# Patient Record
Sex: MALE | Race: BLACK OR AFRICAN AMERICAN | NOT HISPANIC OR LATINO | Employment: OTHER | URBAN - METROPOLITAN AREA
[De-identification: names, ages, dates, MRNs, and addresses within clinical notes are randomized per-mention and may not be internally consistent; named-entity substitution may affect disease eponyms.]

---

## 2018-06-08 ENCOUNTER — HOSPITAL ENCOUNTER (EMERGENCY)
Facility: HOSPITAL | Age: 53
Discharge: HOME | End: 2018-06-08
Attending: EMERGENCY MEDICINE
Payer: COMMERCIAL

## 2018-06-08 VITALS
WEIGHT: 175 LBS | HEART RATE: 72 BPM | SYSTOLIC BLOOD PRESSURE: 119 MMHG | DIASTOLIC BLOOD PRESSURE: 81 MMHG | TEMPERATURE: 99.2 F | OXYGEN SATURATION: 99 % | BODY MASS INDEX: 25.05 KG/M2 | RESPIRATION RATE: 16 BRPM | HEIGHT: 70 IN

## 2018-06-08 DIAGNOSIS — Z20.2 EXPOSURE TO TRICHOMONAS: Primary | ICD-10-CM

## 2018-06-08 DIAGNOSIS — D17.1 LIPOMA OF BACK: ICD-10-CM

## 2018-06-08 LAB
BILIRUB UR QL STRIP: NEGATIVE
CLARITY UR: CLEAR
COLOR UR: YELLOW
GLUCOSE UR STRIP-MCNC: NEGATIVE MG/DL
HGB UR QL STRIP.AUTO: NEGATIVE
KETONES UR STRIP-MCNC: NEGATIVE MG/DL
LEUKOCYTE ESTERASE UR QL STRIP: NEGATIVE
NITRITE UR QL STRIP: NEGATIVE
PH UR STRIP.AUTO: 6.5 [PH] (ref 4.5–8)
PROT UR STRIP-MCNC: NEGATIVE MG/DL
SP GR UR STRIP.AUTO: 1.02 (ref 1–1.03)
UROBILINOGEN UR QL STRIP.AUTO: 0.2 E.U./DL

## 2018-06-08 PROCEDURE — 87491 CHLMYD TRACH DNA AMP PROBE: CPT | Performed by: EMERGENCY MEDICINE

## 2018-06-08 PROCEDURE — 87591 N.GONORRHOEAE DNA AMP PROB: CPT | Performed by: EMERGENCY MEDICINE

## 2018-06-08 PROCEDURE — 99283 EMERGENCY DEPT VISIT LOW MDM: CPT

## 2018-06-08 PROCEDURE — 81003 URINALYSIS AUTO W/O SCOPE: CPT | Performed by: EMERGENCY MEDICINE

## 2018-06-08 PROCEDURE — 87086 URINE CULTURE/COLONY COUNT: CPT | Performed by: EMERGENCY MEDICINE

## 2018-06-08 RX ORDER — METRONIDAZOLE 500 MG/1
500 TABLET ORAL ONCE
Status: COMPLETED | OUTPATIENT
Start: 2018-06-08 | End: 2018-06-08

## 2018-06-08 RX ORDER — METRONIDAZOLE 500 MG/1
500 TABLET ORAL EVERY 12 HOURS SCHEDULED
Qty: 14 TABLET | Refills: 0 | Status: SHIPPED | OUTPATIENT
Start: 2018-06-08 | End: 2018-06-15

## 2018-06-08 RX ADMIN — METRONIDAZOLE 500 MG: 500 TABLET ORAL at 12:06

## 2018-06-08 NOTE — DISCHARGE INSTRUCTIONS
Trichomoniasis   WHAT YOU NEED TO KNOW:   Trichomoniasis is a common sexually transmitted infection (STI)  It is spread between people during sex or genital contact  Trichomoniasis is caused by tiny parasites that are too small to be seen  DISCHARGE INSTRUCTIONS:   Medicines:   · Antibiotics:  Always take your antibiotics exactly as ordered by your healthcare provider  Keep taking this medicine as ordered until it is completely gone, even if you feel better  If you stop taking antibiotics before they are gone, they may not completely cure your infection  Never save antibiotics or take leftover antibiotics that were given to you for another illness  Do not drink alcohol while you use antibiotic medicine to treat trichomoniasis  It may make you sick  Wait at least 3 days after your last dose of medicine before you drink alcohol again  Also avoid over-the-counter medicines that contain alcohol, such as certain cough or cold medicines  · Over-the-counter pain medicine: You may use over-the-counter (OTC) pain medicines, such as ibuprofen or acetaminophen, for pain or swelling  These medicines may be bought without a caregiver's order  These medicines are safe for most people to use  However, they can cause serious problems when they are not used correctly  People with certain medical conditions, or using certain other medicines are at a higher risk for problems  Using too much, or using these medicines for longer than the label says can also cause problems  Follow directions on the label carefully  If you have questions, talk to your caregiver  · Take your medicine as directed  Contact your healthcare provider if you think your medicine is not helping or if you have side effects  Tell him or her if you are allergic to any medicine  Keep a list of the medicines, vitamins, and herbs you take  Include the amounts, and when and why you take them  Bring the list or the pill bottles to follow-up visits  Carry your medicine list with you in case of an emergency  Self care:   · Sex:  Tell your sexual partners that you have this infection  They may also be infected and need treatment  Do not have sex until both you and your partner are done with treatment and all symptoms are gone  · Bathing and handwashing:  Wash your hands thoroughly with soap and warm water after going to the bathroom  This helps keep your infection from spreading to other parts of your body, such as your eyes  Keep your genital area clean and dry  Take showers instead of baths and use plain, unscented soap  · Advice for women:  Do not douche during treatment unless your healthcare provider tells you to  Do not use feminine hygiene sprays or powders  Prevent trichomoniasis:  You can get trichomoniasis more than once  Limit the number of sexual partners you have to decrease your risk for another infection  Do not have unprotected sex (including oral sex)  Always wear a latex condom during sex to prevent trichomoniasis and other STIs  Use a new condom after each ejaculation  For more information:   · Division of STD Prevention, Centers for Disease Control and Prevention  [place] [address] [facility] , [address]  Phone: 1- 268 - [phone]  Web Address: [employer] au  · [address] [address] Rios (MELISSA)  P O  [address] [facility] , [address] [place] [address]  Web Address: http://[employer]/  org  Contact your healthcare provider if:   · Your symptoms become worse, or come back after treatment  · You have unusual vaginal bleeding  · You have any problems that may be caused by the medicine you are taking  · You have questions or concerns about your condition or care  © 2017 [facility] Information is for End User's use only and may not be sold, redistributed or otherwise used for commercial purposes   All illustrations and images included in CareNotes® are the copyrighted property of [employer]  or [employer]  The above information is an  only  It is not intended as medical advice for individual conditions or treatments  Talk to your doctor, nurse or pharmacist before following any medical regimen to see if it is safe and effective for you  Lipoma   WHAT YOU SHOULD KNOW:   A lipoma is a lump made up of fat cells  It is most often found just under your skin on your shoulders, back, or neck, but can be found in other areas of your body  Multiple lipomas found in different areas of your body is called lipomatosis  Lipomas normally grow very slowly and rarely turn into cancer  AFTER YOU LEAVE:   Follow up with your primary healthcare provider as directed: If you had your lipoma removed, you may need to return to have your wound checked or stitches removed  Write down your questions so you remember to ask them during your visits  Contact your primary healthcare provider if:   · You have blood in your bowel movement  · Your lipoma increases in size  · Your lipoma is painful or you have pain in the area of your lipoma  · You have questions or concerns about your condition or care  Seek care immediately or call 911 if:   · You feel a lump in your throat or have trouble swallowing  · You suddenly have trouble breathing  © 2014 [address] is for End User's use only and may not be sold, redistributed or otherwise used for commercial purposes  All illustrations and images included in CareNotes® are the copyrighted property of [employer]  or [employer]  The above information is an  only  It is not intended as medical advice for individual conditions or treatments  Talk to your doctor, nurse or pharmacist before following any medical regimen to see if it is safe and effective for you

## 2018-06-08 NOTE — ED PROVIDER NOTES
History  Chief Complaint   Patient presents with    Exposure to STD     states that his partner tested positive for trichomonis, wants tx  denies symptoms     Patient is a 57-year-old male with no significant past medical surgical history, who presents to the emergency department requesting treatment for trichomoniasis as his sexual partner who is dating recently tested positive for this  Patient states his sexual partner was recently having some vaginal irritation and vaginal discharge  She saw her OBGYN and found out she was positive for trichomoniasis  Patient was told to come to the ED for treatment  He denies any symptoms currently  No fever, chills, abdominal or flank pain, nausea, vomiting, change in bowel habits, penile discharge, dysuria, change in urinary frequency, hematuria, testicular pain or swelling  Rest of review of systems is otherwise negative  He does report for many years he has had a lump on his right scapular region and it is getting progressively larger  He denies that the lump is painful or itchy  Denies any skin discoloration over the lump  He is inquiring as to what it could be  History provided by:  Patient   used: No    Exposure to STD   Associated symptoms: no abdominal pain, no chest pain, no congestion, no cough, no diarrhea, no ear pain, no fever, no headaches, no nausea, no rash, no rhinorrhea, no shortness of breath, no sore throat, no vomiting and no wheezing        None       History reviewed  No pertinent past medical history  History reviewed  No pertinent surgical history  History reviewed  No pertinent family history  I have reviewed and agree with the history as documented  Social History   Substance Use Topics    Smoking status: Never Smoker    Smokeless tobacco: Never Used    Alcohol use Yes      Comment: occ        Review of Systems   Constitutional: Negative for chills and fever     HENT: Negative for congestion, ear pain, rhinorrhea and sore throat  Eyes: Negative for photophobia, pain and visual disturbance  Respiratory: Negative for cough, shortness of breath and wheezing  Cardiovascular: Negative for chest pain and palpitations  Gastrointestinal: Negative for abdominal pain, constipation, diarrhea, nausea and vomiting  Genitourinary: Negative for difficulty urinating, dysuria, flank pain, frequency, genital sores, hematuria, penile pain, penile swelling, scrotal swelling and testicular pain  Musculoskeletal: Negative for back pain, joint swelling, neck pain and neck stiffness  Skin: Negative for color change, rash and wound         + Lump on back   Allergic/Immunologic: Negative for immunocompromised state  Neurological: Negative for dizziness, weakness, light-headedness, numbness and headaches  Hematological: Negative for adenopathy  Does not bruise/bleed easily  Psychiatric/Behavioral: Negative for confusion and decreased concentration  All other systems reviewed and are negative  Physical Exam  Physical Exam   Constitutional: He is oriented to person, place, and time  He appears well-developed and well-nourished  No distress  HENT:   Head: Normocephalic and atraumatic  Mouth/Throat: Oropharynx is clear and moist    Eyes: Conjunctivae and EOM are normal  Pupils are equal, round, and reactive to light  Neck: Normal range of motion  Neck supple  No JVD present  Cardiovascular: Normal rate, regular rhythm, normal heart sounds and intact distal pulses  Exam reveals no gallop and no friction rub  No murmur heard  Pulmonary/Chest: Effort normal and breath sounds normal  No respiratory distress  He has no wheezes  He has no rales  Abdominal: Soft  He exhibits no distension  There is no tenderness  There is no rebound and no guarding  Musculoskeletal: Normal range of motion  He exhibits no edema or tenderness  Neurological: He is alert and oriented to person, place, and time     No gross motor or sensory deficits  Skin: Skin is warm and dry  No rash noted  He is not diaphoretic  No pallor  Nontender lipoma over the right scapula measuring approximately 5 cm in diameter  Psychiatric: He has a normal mood and affect  His behavior is normal    Nursing note and vitals reviewed  Vital Signs  ED Triage Vitals [06/08/18 1127]   Temperature Pulse Respirations Blood Pressure SpO2   99 2 °F (37 3 °C) 72 16 119/81 99 %      Temp Source Heart Rate Source Patient Position - Orthostatic VS BP Location FiO2 (%)   Oral Monitor Sitting Right arm --      Pain Score       No Pain           Vitals:    06/08/18 1127   BP: 119/81   Pulse: 72   Patient Position - Orthostatic VS: Sitting       Visual Acuity      ED Medications  Medications   metroNIDAZOLE (FLAGYL) tablet 500 mg (500 mg Oral Given 6/8/18 1206)       Diagnostic Studies  Results Reviewed     Procedure Component Value Units Date/Time    UA w Reflex to Microscopic [[id number]] Collected:  06/08/18 1206    Lab Status:  Final result Specimen:  Urine from Urine, Clean Catch Updated:  06/08/18 1218     Color, UA Yellow     Clarity, UA Clear     Specific [place], UA 1 020     pH, UA 6 5     Leukocytes, UA Negative     Nitrite, UA Negative     Protein, UA Negative mg/dl      Glucose, UA Negative mg/dl      Ketones, UA Negative mg/dl      Urobilinogen, UA 0 2 E U /dl      Bilirubin, UA Negative     Blood, UA Negative    Urine culture [[id number]] Collected:  06/08/18 1206    Lab Status: In process Specimen:  Urine from Urine, Clean Catch Updated:  06/08/18 1212    Chlamydia/GC amplified DNA by PCR [[id number]] Collected:  06/08/18 1206    Lab Status:   In process Specimen:  Urine from Urine, Other Updated:  06/08/18 1212                 No orders to display              Procedures  Procedures       Phone Contacts  ED Phone Contact    ED Course                               MDM  Number of Diagnoses or Management Options  Diagnosis management comments: 45-year-old male presents with request to be tested for trichomoniasis as uses recently exposed to this infection  Will obtain urinalysis, urine culture and urine GC/chlamydia  Will empirically treat with a course of Flagyl  Given that patient is asymptomatic, will hold off on treating for gonorrhea and chlamydia and await cultures  As far as the lipoma, reassured patient that this is a benign fatty tumor and if he wishes for to be surgically removed, will give referral to General surgery and dermatology  Advised patient to refrain from sexual intercourse for at least 1 week after treatment and to ensure that his partner is asymptomatic prior to resuming intercourse  Counseled patient on safe sex practices  Amount and/or Complexity of Data Reviewed  Clinical lab tests: ordered and reviewed      CritCare Time    Disposition  Final diagnoses:   Exposure to trichomonas   Lipoma of back     Time reflects when diagnosis was documented in both MDM as applicable and the Disposition within this note     Time User Action Codes Description Comment    6/8/2018 12:00 PM Candace Feliciano [Z20 2] Exposure to trichomonas     6/8/2018 12:00 PM Candace Feliciano [D17 1] Lipoma of back       ED Disposition     ED Disposition Condition Comment    Discharge  Aparna Baeza discharge to home/self care      Condition at discharge: Stable        Follow-up Information     Follow up With Specialties Details Why Contact Info Additional Information    Infolink  Call To establish care with a primary care doctor [phone]       Jaleesa Williamson MD Dermatology Schedule an appointment as soon as possible for a visit  [address] [facility]       Kwadwo Sarah MD General Surgery Schedule an appointment as soon as possible for a visit  [phone]  [facility] 49 [phone] [facility] Emergency Department Emergency Medicine Go to If symptoms worsen [address] [facility] [address]  273 [facility], [address] [place] [address], [place], [place], [address]          Discharge Medication List as of 6/8/2018 12:02 PM      START taking these medications    Details   metroNIDAZOLE (FLAGYL) 500 mg tablet Take 1 tablet (500 mg total) by mouth every 12 (twelve) hours for 7 days, Starting Fri 6/8/2018, Until Fri 6/15/2018, Print           No discharge procedures on file      ED Provider  Electronically Signed by           Dennis Meyers DO  06/08/18 3250

## 2018-06-09 LAB — BACTERIA UR CULT: NORMAL

## 2018-06-11 LAB
CHLAMYDIA DNA CVX QL NAA+PROBE: NORMAL
N GONORRHOEA DNA GENITAL QL NAA+PROBE: NORMAL